# Patient Record
Sex: FEMALE | Race: BLACK OR AFRICAN AMERICAN | Employment: UNEMPLOYED | URBAN - METROPOLITAN AREA
[De-identification: names, ages, dates, MRNs, and addresses within clinical notes are randomized per-mention and may not be internally consistent; named-entity substitution may affect disease eponyms.]

---

## 2023-03-29 ENCOUNTER — APPOINTMENT (OUTPATIENT)
Dept: GENERAL RADIOLOGY | Age: 28
End: 2023-03-29
Payer: COMMERCIAL

## 2023-03-29 ENCOUNTER — HOSPITAL ENCOUNTER (EMERGENCY)
Age: 28
Discharge: HOME | End: 2023-03-29
Attending: EMERGENCY MEDICINE
Payer: COMMERCIAL

## 2023-03-29 VITALS
HEART RATE: 72 BPM | DIASTOLIC BLOOD PRESSURE: 71 MMHG | OXYGEN SATURATION: 97 % | WEIGHT: 191.36 LBS | TEMPERATURE: 98.6 F | RESPIRATION RATE: 16 BRPM | SYSTOLIC BLOOD PRESSURE: 132 MMHG

## 2023-03-29 DIAGNOSIS — M25.461 KNEE EFFUSION, RIGHT: Primary | ICD-10-CM

## 2023-03-29 PROCEDURE — 99283 EMERGENCY DEPT VISIT LOW MDM: CPT

## 2023-03-29 PROCEDURE — 73562 X-RAY EXAM OF KNEE 3: CPT

## 2023-03-29 PROCEDURE — 73630 X-RAY EXAM OF FOOT: CPT

## 2023-03-29 PROCEDURE — 6370000000 HC RX 637 (ALT 250 FOR IP): Performed by: PHYSICIAN ASSISTANT

## 2023-03-29 RX ORDER — IBUPROFEN 400 MG/1
800 TABLET ORAL ONCE
Status: COMPLETED | OUTPATIENT
Start: 2023-03-29 | End: 2023-03-29

## 2023-03-29 RX ORDER — HYDRALAZINE HYDROCHLORIDE 100 MG/1
100 TABLET, FILM COATED ORAL ONCE
Status: DISCONTINUED | OUTPATIENT
Start: 2023-03-29 | End: 2023-03-29 | Stop reason: HOSPADM

## 2023-03-29 RX ADMIN — IBUPROFEN 800 MG: 400 TABLET, FILM COATED ORAL at 20:04

## 2023-03-29 ASSESSMENT — ENCOUNTER SYMPTOMS
NAUSEA: 0
ABDOMINAL PAIN: 0
BACK PAIN: 0
EYES NEGATIVE: 1
SHORTNESS OF BREATH: 0
DIARRHEA: 0
COUGH: 0
CONSTIPATION: 0
CHEST TIGHTNESS: 0

## 2023-03-29 ASSESSMENT — PAIN - FUNCTIONAL ASSESSMENT: PAIN_FUNCTIONAL_ASSESSMENT: NONE - DENIES PAIN
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
## 2023-03-29 NOTE — DISCHARGE INSTRUCTIONS
You are seen in the emergency department for knee pain. Your right knee has a suprapatellar effusion meaning that there is swelling around the right knee. Your right knee Is in place. However, would recommend that she wear brace for comfort. You may use crutches for the next 1 week or until you see orthopedics and are feeling comfortable with ambulating. Please call them for an appointment. Return to the emergency department with redness, worsening swelling, inability to move the right knee, fevers or chills.

## 2023-03-29 NOTE — Clinical Note
Paresh Tadeo was seen and treated in our emergency department on 3/29/2023. She may return to work on 04/03/2023. If you have any questions or concerns, please don't hesitate to call.       Lucia Hollingsworth PA-C

## 2023-03-29 NOTE — ED PROVIDER NOTES
ED Attending Attestation Note     Date of evaluation: 3/29/2023    This patient was seen by the advance practice provider. I have seen and examined the patient, agree with the workup, evaluation, management and diagnosis. The care plan has been discussed. My assessment reveals patient with right knee pain, no injury. Various other joints have swollen similarly then gone down. No erythema. No pain with axial load of joint. Xray shows patellar tilt and effusion. Do not suspect septic joint but will refer to ortho and trial NSAIDs.      Juanita Monroy MD  03/29/23 2030

## 2023-04-03 ENCOUNTER — TELEPHONE (OUTPATIENT)
Dept: ORTHOPEDIC SURGERY | Age: 28
End: 2023-04-03

## 2023-04-03 NOTE — TELEPHONE ENCOUNTER
General Question     Subject: pt called about script  Patient and /or Facility Request: Lorne Peck Number: [phone]      The pt called because her pharmacy didn't have the prescription from her 3:00 visit with Dr. Harvey Man. I advised the patient to give it 24 hours.

## 2023-04-12 ENCOUNTER — APPOINTMENT (OUTPATIENT)
Dept: GENERAL RADIOLOGY | Age: 28
End: 2023-04-12
Payer: COMMERCIAL

## 2023-04-12 ENCOUNTER — HOSPITAL ENCOUNTER (EMERGENCY)
Age: 28
Discharge: HOME | End: 2023-04-12
Attending: EMERGENCY MEDICINE
Payer: COMMERCIAL

## 2023-04-12 VITALS
HEART RATE: 90 BPM | TEMPERATURE: 98.3 F | DIASTOLIC BLOOD PRESSURE: 65 MMHG | RESPIRATION RATE: 17 BRPM | WEIGHT: 175 LBS | OXYGEN SATURATION: 95 % | BODY MASS INDEX: 28.25 KG/M2 | SYSTOLIC BLOOD PRESSURE: 106 MMHG

## 2023-04-12 DIAGNOSIS — E83.42 HYPOMAGNESEMIA: ICD-10-CM

## 2023-04-12 DIAGNOSIS — E87.6 HYPOKALEMIA: ICD-10-CM

## 2023-04-12 DIAGNOSIS — R55 SYNCOPE AND COLLAPSE: Primary | ICD-10-CM

## 2023-04-12 LAB
ANION GAP SERPL CALCULATED.3IONS-SCNC: 17 MMOL/L (ref 3–16)
BASOPHILS # BLD: 0 K/UL (ref 0–0.2)
BASOPHILS NFR BLD: 0.2 %
BUN SERPL-MCNC: 11 MG/DL (ref 7–20)
CALCIUM SERPL-MCNC: 9.5 MG/DL (ref 8.3–10.6)
CHLORIDE SERPL-SCNC: 98 MMOL/L (ref 99–110)
CO2 SERPL-SCNC: 22 MMOL/L (ref 21–32)
CREAT SERPL-MCNC: 0.7 MG/DL (ref 0.6–1.1)
DEPRECATED RDW RBC AUTO: 13.3 % (ref 12.4–15.4)
EKG ATRIAL RATE: 78 BPM
EKG DIAGNOSIS: NORMAL
EKG P AXIS: 33 DEGREES
EKG P-R INTERVAL: 184 MS
EKG Q-T INTERVAL: 392 MS
EKG QRS DURATION: 74 MS
EKG QTC CALCULATION (BAZETT): 446 MS
EKG R AXIS: 90 DEGREES
EKG T AXIS: 24 DEGREES
EKG VENTRICULAR RATE: 78 BPM
EOSINOPHIL # BLD: 0.1 K/UL (ref 0–0.6)
EOSINOPHIL NFR BLD: 0.5 %
GFR SERPLBLD CREATININE-BSD FMLA CKD-EPI: >60 ML/MIN/{1.73_M2}
GLUCOSE SERPL-MCNC: 103 MG/DL (ref 70–99)
HCG SERPL QL: NEGATIVE
HCT VFR BLD AUTO: 34.3 % (ref 36–48)
HGB BLD-MCNC: 11.3 G/DL (ref 12–16)
LYMPHOCYTES # BLD: 4.3 K/UL (ref 1–5.1)
LYMPHOCYTES NFR BLD: 31 %
MAGNESIUM SERPL-MCNC: 1.3 MG/DL (ref 1.8–2.4)
MCH RBC QN AUTO: 26.6 PG (ref 26–34)
MCHC RBC AUTO-ENTMCNC: 33.1 G/DL (ref 31–36)
MCV RBC AUTO: 80.5 FL (ref 80–100)
MONOCYTES # BLD: 0.7 K/UL (ref 0–1.3)
MONOCYTES NFR BLD: 5.1 %
NEUTROPHILS # BLD: 8.8 K/UL (ref 1.7–7.7)
NEUTROPHILS NFR BLD: 63.2 %
PLATELET # BLD AUTO: 541 K/UL (ref 135–450)
PMV BLD AUTO: 7.1 FL (ref 5–10.5)
POTASSIUM SERPL-SCNC: 3 MMOL/L (ref 3.5–5.1)
RBC # BLD AUTO: 4.26 M/UL (ref 4–5.2)
SODIUM SERPL-SCNC: 137 MMOL/L (ref 136–145)
WBC # BLD AUTO: 13.9 K/UL (ref 4–11)

## 2023-04-12 PROCEDURE — 85025 COMPLETE CBC W/AUTO DIFF WBC: CPT

## 2023-04-12 PROCEDURE — 6370000000 HC RX 637 (ALT 250 FOR IP): Performed by: STUDENT IN AN ORGANIZED HEALTH CARE EDUCATION/TRAINING PROGRAM

## 2023-04-12 PROCEDURE — 6370000000 HC RX 637 (ALT 250 FOR IP): Performed by: EMERGENCY MEDICINE

## 2023-04-12 PROCEDURE — 80048 BASIC METABOLIC PNL TOTAL CA: CPT

## 2023-04-12 PROCEDURE — 96375 TX/PRO/DX INJ NEW DRUG ADDON: CPT

## 2023-04-12 PROCEDURE — 93005 ELECTROCARDIOGRAM TRACING: CPT | Performed by: EMERGENCY MEDICINE

## 2023-04-12 PROCEDURE — 96365 THER/PROPH/DIAG IV INF INIT: CPT

## 2023-04-12 PROCEDURE — 83735 ASSAY OF MAGNESIUM: CPT

## 2023-04-12 PROCEDURE — 84703 CHORIONIC GONADOTROPIN ASSAY: CPT

## 2023-04-12 PROCEDURE — 36415 COLL VENOUS BLD VENIPUNCTURE: CPT

## 2023-04-12 PROCEDURE — 71045 X-RAY EXAM CHEST 1 VIEW: CPT

## 2023-04-12 PROCEDURE — 99285 EMERGENCY DEPT VISIT HI MDM: CPT

## 2023-04-12 PROCEDURE — 6360000002 HC RX W HCPCS: Performed by: EMERGENCY MEDICINE

## 2023-04-12 PROCEDURE — 96366 THER/PROPH/DIAG IV INF ADDON: CPT

## 2023-04-12 RX ORDER — POTASSIUM CHLORIDE 20 MEQ/1
40 TABLET, EXTENDED RELEASE ORAL ONCE
Status: COMPLETED | OUTPATIENT
Start: 2023-04-12 | End: 2023-04-12

## 2023-04-12 RX ORDER — ONDANSETRON 4 MG/1
4 TABLET, ORALLY DISINTEGRATING ORAL ONCE
Status: COMPLETED | OUTPATIENT
Start: 2023-04-12 | End: 2023-04-12

## 2023-04-12 RX ORDER — ONDANSETRON 4 MG/1
4 TABLET, ORALLY DISINTEGRATING ORAL 3 TIMES DAILY PRN
Qty: 21 TABLET | Refills: 0 | Status: SHIPPED | OUTPATIENT
Start: 2023-04-12

## 2023-04-12 RX ORDER — LORAZEPAM 2 MG/ML
0.5 INJECTION INTRAMUSCULAR ONCE
Status: COMPLETED | OUTPATIENT
Start: 2023-04-12 | End: 2023-04-12

## 2023-04-12 RX ORDER — MAGNESIUM SULFATE IN WATER 40 MG/ML
2000 INJECTION, SOLUTION INTRAVENOUS ONCE
Status: COMPLETED | OUTPATIENT
Start: 2023-04-12 | End: 2023-04-12

## 2023-04-12 RX ADMIN — LORAZEPAM 0.5 MG: 2 INJECTION INTRAMUSCULAR; INTRAVENOUS at 04:35

## 2023-04-12 RX ADMIN — ONDANSETRON 4 MG: 4 TABLET, ORALLY DISINTEGRATING ORAL at 09:36

## 2023-04-12 RX ADMIN — POTASSIUM CHLORIDE 40 MEQ: 1500 TABLET, EXTENDED RELEASE ORAL at 06:01

## 2023-04-12 RX ADMIN — MAGNESIUM SULFATE HEPTAHYDRATE 2000 MG: 40 INJECTION, SOLUTION INTRAVENOUS at 06:40

## 2023-04-12 ASSESSMENT — ENCOUNTER SYMPTOMS
GASTROINTESTINAL NEGATIVE: 1
EYES NEGATIVE: 1
RESPIRATORY NEGATIVE: 1

## 2023-04-12 NOTE — DISCHARGE INSTRUCTIONS
Drink plenty of clear fluids. Continue your current medications. Follow-up with your primary care provider for repeat evaluation and further testing if symptoms continue.
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
## 2023-04-15 ENCOUNTER — APPOINTMENT (OUTPATIENT)
Dept: CT IMAGING | Age: 28
End: 2023-04-15
Payer: COMMERCIAL

## 2023-04-15 ENCOUNTER — APPOINTMENT (OUTPATIENT)
Dept: GENERAL RADIOLOGY | Age: 28
End: 2023-04-15
Payer: COMMERCIAL

## 2023-04-15 ENCOUNTER — HOSPITAL ENCOUNTER (EMERGENCY)
Age: 28
Discharge: HOME | End: 2023-04-15
Attending: STUDENT IN AN ORGANIZED HEALTH CARE EDUCATION/TRAINING PROGRAM
Payer: COMMERCIAL

## 2023-04-15 VITALS
RESPIRATION RATE: 18 BRPM | HEIGHT: 62 IN | DIASTOLIC BLOOD PRESSURE: 52 MMHG | WEIGHT: 175 LBS | OXYGEN SATURATION: 99 % | TEMPERATURE: 98.2 F | BODY MASS INDEX: 32.2 KG/M2 | HEART RATE: 65 BPM | SYSTOLIC BLOOD PRESSURE: 114 MMHG

## 2023-04-15 DIAGNOSIS — R10.11 ABDOMINAL PAIN, RIGHT UPPER QUADRANT: Primary | ICD-10-CM

## 2023-04-15 DIAGNOSIS — R07.81 PLEURITIC PAIN: ICD-10-CM

## 2023-04-15 DIAGNOSIS — E83.42 HYPOMAGNESEMIA: ICD-10-CM

## 2023-04-15 LAB
ALBUMIN SERPL-MCNC: 3.1 G/DL (ref 3.4–5)
ALBUMIN/GLOB SERPL: 0.7 {RATIO} (ref 1.1–2.2)
ALP SERPL-CCNC: 87 U/L (ref 40–129)
ALT SERPL-CCNC: 10 U/L (ref 10–40)
ANION GAP SERPL CALCULATED.3IONS-SCNC: 12 MMOL/L (ref 3–16)
AST SERPL-CCNC: 26 U/L (ref 15–37)
BASOPHILS # BLD: 0 K/UL (ref 0–0.2)
BASOPHILS NFR BLD: 0.2 %
BILIRUB SERPL-MCNC: 0.4 MG/DL (ref 0–1)
BILIRUB UR QL STRIP.AUTO: NEGATIVE
BUN SERPL-MCNC: 10 MG/DL (ref 7–20)
CALCIUM SERPL-MCNC: 8.8 MG/DL (ref 8.3–10.6)
CHLORIDE SERPL-SCNC: 100 MMOL/L (ref 99–110)
CLARITY UR: CLEAR
CO2 SERPL-SCNC: 23 MMOL/L (ref 21–32)
COLOR UR: YELLOW
CREAT SERPL-MCNC: 0.6 MG/DL (ref 0.6–1.1)
D DIMER: 2.75 UG/ML FEU (ref 0–0.6)
DEPRECATED RDW RBC AUTO: 13.3 % (ref 12.4–15.4)
EOSINOPHIL # BLD: 0.1 K/UL (ref 0–0.6)
EOSINOPHIL NFR BLD: 0.9 %
EPI CELLS #/AREA URNS HPF: ABNORMAL /HPF (ref 0–5)
GFR SERPLBLD CREATININE-BSD FMLA CKD-EPI: >60 ML/MIN/{1.73_M2}
GLUCOSE SERPL-MCNC: 97 MG/DL (ref 70–99)
GLUCOSE UR STRIP.AUTO-MCNC: NEGATIVE MG/DL
HCG UR QL: NEGATIVE
HCT VFR BLD AUTO: 36.3 % (ref 36–48)
HGB BLD-MCNC: 12 G/DL (ref 12–16)
HGB UR QL STRIP.AUTO: NEGATIVE
KETONES UR STRIP.AUTO-MCNC: NEGATIVE MG/DL
LEUKOCYTE ESTERASE UR QL STRIP.AUTO: ABNORMAL
LIPASE SERPL-CCNC: 18 U/L (ref 13–60)
LYMPHOCYTES # BLD: 1.4 K/UL (ref 1–5.1)
LYMPHOCYTES NFR BLD: 16.9 %
MAGNESIUM SERPL-MCNC: 1.7 MG/DL (ref 1.8–2.4)
MCH RBC QN AUTO: 26.7 PG (ref 26–34)
MCHC RBC AUTO-ENTMCNC: 33.1 G/DL (ref 31–36)
MCV RBC AUTO: 80.7 FL (ref 80–100)
MONOCYTES # BLD: 0.5 K/UL (ref 0–1.3)
MONOCYTES NFR BLD: 6 %
MUCOUS THREADS #/AREA URNS LPF: ABNORMAL /LPF
NEUTROPHILS # BLD: 6.3 K/UL (ref 1.7–7.7)
NEUTROPHILS NFR BLD: 76 %
NITRITE UR QL STRIP.AUTO: NEGATIVE
PH UR STRIP.AUTO: 7.5 [PH] (ref 5–8)
PLATELET # BLD AUTO: 394 K/UL (ref 135–450)
PMV BLD AUTO: 7 FL (ref 5–10.5)
POTASSIUM SERPL-SCNC: 5.1 MMOL/L (ref 3.5–5.1)
POTASSIUM SERPL-SCNC: 6.1 MMOL/L (ref 3.5–5.1)
PROT SERPL-MCNC: 7.8 G/DL (ref 6.4–8.2)
PROT UR STRIP.AUTO-MCNC: NEGATIVE MG/DL
RBC # BLD AUTO: 4.5 M/UL (ref 4–5.2)
RBC #/AREA URNS HPF: ABNORMAL /HPF (ref 0–4)
SODIUM SERPL-SCNC: 135 MMOL/L (ref 136–145)
SP GR UR STRIP.AUTO: 1.02 (ref 1–1.03)
TROPONIN T SERPL-MCNC: <0.01 NG/ML
UA COMPLETE W REFLEX CULTURE PNL UR: ABNORMAL
UA DIPSTICK W REFLEX MICRO PNL UR: YES
URN SPEC COLLECT METH UR: ABNORMAL
UROBILINOGEN UR STRIP-ACNC: 0.2 E.U./DL
WBC # BLD AUTO: 8.3 K/UL (ref 4–11)
WBC #/AREA URNS HPF: ABNORMAL /HPF (ref 0–5)

## 2023-04-15 PROCEDURE — 85025 COMPLETE CBC W/AUTO DIFF WBC: CPT

## 2023-04-15 PROCEDURE — 84484 ASSAY OF TROPONIN QUANT: CPT

## 2023-04-15 PROCEDURE — 96374 THER/PROPH/DIAG INJ IV PUSH: CPT

## 2023-04-15 PROCEDURE — 83690 ASSAY OF LIPASE: CPT

## 2023-04-15 PROCEDURE — 80053 COMPREHEN METABOLIC PANEL: CPT

## 2023-04-15 PROCEDURE — 83735 ASSAY OF MAGNESIUM: CPT

## 2023-04-15 PROCEDURE — 93005 ELECTROCARDIOGRAM TRACING: CPT | Performed by: STUDENT IN AN ORGANIZED HEALTH CARE EDUCATION/TRAINING PROGRAM

## 2023-04-15 PROCEDURE — 84132 ASSAY OF SERUM POTASSIUM: CPT

## 2023-04-15 PROCEDURE — 6360000002 HC RX W HCPCS: Performed by: STUDENT IN AN ORGANIZED HEALTH CARE EDUCATION/TRAINING PROGRAM

## 2023-04-15 PROCEDURE — 96365 THER/PROPH/DIAG IV INF INIT: CPT

## 2023-04-15 PROCEDURE — 74177 CT ABD & PELVIS W/CONTRAST: CPT

## 2023-04-15 PROCEDURE — 81001 URINALYSIS AUTO W/SCOPE: CPT

## 2023-04-15 PROCEDURE — 71275 CT ANGIOGRAPHY CHEST: CPT

## 2023-04-15 PROCEDURE — 6370000000 HC RX 637 (ALT 250 FOR IP): Performed by: STUDENT IN AN ORGANIZED HEALTH CARE EDUCATION/TRAINING PROGRAM

## 2023-04-15 PROCEDURE — 85379 FIBRIN DEGRADATION QUANT: CPT

## 2023-04-15 PROCEDURE — 71045 X-RAY EXAM CHEST 1 VIEW: CPT

## 2023-04-15 PROCEDURE — 6360000004 HC RX CONTRAST MEDICATION: Performed by: STUDENT IN AN ORGANIZED HEALTH CARE EDUCATION/TRAINING PROGRAM

## 2023-04-15 PROCEDURE — 84703 CHORIONIC GONADOTROPIN ASSAY: CPT

## 2023-04-15 PROCEDURE — 99285 EMERGENCY DEPT VISIT HI MDM: CPT

## 2023-04-15 RX ORDER — MAGNESIUM SULFATE 1 G/100ML
1000 INJECTION INTRAVENOUS ONCE
Status: COMPLETED | OUTPATIENT
Start: 2023-04-15 | End: 2023-04-15

## 2023-04-15 RX ORDER — KETOROLAC TROMETHAMINE 30 MG/ML
15 INJECTION, SOLUTION INTRAMUSCULAR; INTRAVENOUS ONCE
Status: COMPLETED | OUTPATIENT
Start: 2023-04-15 | End: 2023-04-15

## 2023-04-15 RX ORDER — LIDOCAINE 4 G/G
1 PATCH TOPICAL DAILY
Status: DISCONTINUED | OUTPATIENT
Start: 2023-04-15 | End: 2023-04-15 | Stop reason: HOSPADM

## 2023-04-15 RX ADMIN — KETOROLAC TROMETHAMINE 15 MG: 30 INJECTION, SOLUTION INTRAMUSCULAR at 14:17

## 2023-04-15 RX ADMIN — IOPAMIDOL 75 ML: 755 INJECTION, SOLUTION INTRAVENOUS at 15:47

## 2023-04-15 RX ADMIN — MAGNESIUM SULFATE IN DEXTROSE 1000 MG: 10 INJECTION, SOLUTION INTRAVENOUS at 16:43

## 2023-04-15 ASSESSMENT — ENCOUNTER SYMPTOMS
COUGH: 0
NAUSEA: 1
BLOOD IN STOOL: 0
ABDOMINAL DISTENTION: 0
DIARRHEA: 0
CONSTIPATION: 0
VOMITING: 0
SHORTNESS OF BREATH: 0
ABDOMINAL PAIN: 1

## 2023-04-15 ASSESSMENT — PAIN SCALES - GENERAL: PAINLEVEL_OUTOF10: 10

## 2023-04-15 ASSESSMENT — PAIN DESCRIPTION - ORIENTATION: ORIENTATION: RIGHT

## 2023-04-15 ASSESSMENT — PAIN DESCRIPTION - LOCATION: LOCATION: ABDOMEN

## 2023-04-15 ASSESSMENT — PAIN - FUNCTIONAL ASSESSMENT: PAIN_FUNCTIONAL_ASSESSMENT: 0-10

## 2023-04-15 NOTE — ED NOTES
Patient prepared for and ready to be discharged. Patient discharged at this time in no acute distress after verbalizing understanding of discharge instructions. Patient left after receiving After Visit Summary instructions.        Mikael Staples RN  04/15/23 9290

## 2023-04-15 NOTE — PROGRESS NOTES
[address] RESIDENT NOTE       Date of evaluation: 4/15/2023    Chief Complaint     Abdominal Pain (Started this morning, R sided, into back with breathing )      History of Present Illness     Gavin Slater is a 24 y.o. female who presents with chief complaint of abdominal pain. The pain began at 1 am this morning while the patient was resting. It is described as 10/10 constant squeezing pain that is made worse with inspiration. She has not experienced pain like this before. The pain is worse on the right side and spreads to her back. There is no associated fever, chills, chest pain, shortness of breath,nausea, vomiting, constipation, diarrhea. Pain is not related to food intake. Her medications include cetirizine and zoloft. She does not use oral contraceptives or take other medications. She was recently seen by orthopedics due to right sided knee pain for which she is in a knee brace. MRI from 4/12/23 demonstrated large knee joint effusion, with no evidence of meniscal tearing or ligamentous injury. She ambulates using crutches. ASSESSMENT / PLAN  (MEDICAL DECISION MAKING)     INITIAL VITALS: BP: (!) 128/97, Temp: 98.2 °F (36.8 °C), Heart Rate: 88, Resp: 22, SpO2: 99 %     Gavin Slater is a 24 y.o. female who presented to the emergency department due to chief complaint of abdominal pain that began at 1 am and has been progressively worsening. Her pain is right-sided and worse with deep inspiration. The patient is afebrile and hemodynamically stable. An EKG was performed which was negative for acute ischemic changes. Troponin was negative. A chest x-ray was negative for acute pathology including rib fractures. She has no leukocytosis on CBC. She had a potassium which was initially hemolyzed at 6.1 and returned at 5.1 on recheck. Magnesium is slightly low at 5.1. Liver enzymes and lipase are all within normal limits.  The patient did have an elevated d-dimer

## 2023-04-15 NOTE — ED PROVIDER NOTES
ED Attending Attestation Note     Date of evaluation: 4/15/2023    This patient was seen by the resident. I have seen and examined the patient, agree with the workup, evaluation, management and diagnosis. The care plan has been discussed. I have reviewed the ECG and concur with the resident's interpretation. My assessment reveals very pleasant 21-year-old woman currently being followed outpatient for right knee pain recently seen in the ER after syncopal episode presenting with right upper quadrant pleuritic pain that radiates around to the back. Patient denies any preceding trauma, change in activity, severe coughing or retching. Denies associated nausea, vomiting, abdominal pain, urinary symptoms, upper respiratory symptoms or dyspnea. Denies syncopal episode today. Reports she is following outpatient for knee pain and recently underwent an MRI orthopedics and denies any new symptoms stating that at this time that she is here for abdominal pain. Denies any chest pain. On exam patient is well-appearing seated stretcher no acute distress. Patient with clear lungs, strong radial pulses bilaterally, no peripheral edema. Patient with mild amount of tenderness over the right flank and right upper quadrant with no rebound or guarding. UA with small leukocyte esterase with large amount epithelial cells on micro with patient not currently having urinary symptoms with low suspicion for UTI. Given recent syncopal episode and PERC +, D-dimer ordered for evaluation of PE with pleuritic discomfort. D-dimer was positive and CT PE scan showed no evidence of acute PE.  CT abdomen pelvis showed no acute intradermal abnormalities but did note lymphadenopathy of the right leg. These results were discussed with patient. Patient abdominal pain improved with lidocaine patch and Toradol.    On repeat exam, patient had full range of motion of knee without pain with no changes to her symptoms and no fever or
Surgical Hx, Social Hx, Allergies, and FamilyHx were reviewed. ED Course as of 04/15/23 1945   Sat Apr 15, 2023   1426 WBC: 8.3 [ED]   1433 HCG(Urine) Pregnancy Test: Negative [ED]   1433 Epithelial Cells, UA(!): 6-10 [ED]   1433 WBC, UA(!): 6-9 [ED]   1537 D-Dimer, Quant(!): 2.75 [DS]   1537 D-Dimer, Quantitative(!):    D-Dimer, Quant 2.75(!) [DS]   7784 D-Dimer, Quantitative(!):    D-Dimer, Quant 2.75(!) [DS]   1539 D-Dimer, Quant(!): 2.75 [DS]      ED Course User Index  [DS] Jordy Angel MD  [ED] Iain Bustamante MD       The patient was given the following medications:  Orders Placed This Encounter   Medications    ketorolac (TORADOL) injection 15 mg    lidocaine 4 % external patch 1 patch    iopamidol (ISOVUE-370) 76 % injection 75 mL    magnesium sulfate 1000 mg in dextrose 5% 100 mL IVPB       CONSULTS:  None    Review of Systems   Constitutional:  Negative for appetite change, chills and fever. HENT:  Negative for congestion. Respiratory:  Negative for cough and shortness of breath. Cardiovascular:  Negative for chest pain, palpitations and leg swelling. Gastrointestinal:  Positive for abdominal pain and nausea. Negative for abdominal distention, blood in stool, constipation, diarrhea and vomiting. Genitourinary:  Negative for dysuria, frequency and urgency. Neurological:  Negative for headaches. Past Medical, Surgical, Family, and Social History     She has no past medical history on file. She has no past surgical history on file. Her family history is not on file. She reports that she has quit smoking. She does not have any smokeless tobacco history on file. She reports current alcohol use. She reports that she does not currently use drugs after having used the following drugs: Marijuana Ian Lindsey).     Medications     Discharge Medication List as of 4/15/2023  5:50 PM        CONTINUE these medications which have NOT CHANGED    Details   ondansetron (ZOFRAN-ODT) 4 MG disintegrating

## 2023-04-15 NOTE — DISCHARGE INSTRUCTIONS
-You were evaluated in the emergency department due to right sided pain in your abdomen.   -We performed CT imaging abdomen and pelvis which did not show any abnormalities such as an abscess or kidney stone. It did show that you slightly enlarged lymph nodes, which were related to your recent knee swelling as we had discussed. We also performed CT imaging of your chest to evaluate for a pulmonary embolism, which is a blood clot in your lungs. This test was also negative.   -We gave you pain medication (lidocaine patch and Toradol injection) which resulted in improvement of pain. You can alternate taking tylenol and ibuprofen which are available over the counter, with care not to exceed the maximum dose for either in a 24 hour period. You may also use lidocaine patches over the counter as needed for pain control.   -You should return to the emergency department to be evaluated if you experience fevers, syncope (passing out or losing consciousness), severe worsening of chest or abdominal pain, shortness of breath, worsening knee pain. -You should follow up in one week with your primary care physician and call to make an appointment with them. You should continue to follow up with your scheduled orthopedic appointment for ongoing evaluation of your knee.

## 2023-04-16 LAB
EKG ATRIAL RATE: 67 BPM
EKG DIAGNOSIS: NORMAL
EKG P AXIS: 30 DEGREES
EKG P-R INTERVAL: 178 MS
EKG Q-T INTERVAL: 366 MS
EKG QRS DURATION: 84 MS
EKG QTC CALCULATION (BAZETT): 386 MS
EKG R AXIS: 112 DEGREES
EKG T AXIS: 90 DEGREES
EKG VENTRICULAR RATE: 67 BPM

## 2023-04-24 ENCOUNTER — OFFICE VISIT (OUTPATIENT)
Dept: ORTHOPEDIC SURGERY | Age: 28
End: 2023-04-24
Payer: COMMERCIAL

## 2023-04-24 VITALS — HEIGHT: 62 IN | WEIGHT: 175 LBS | BODY MASS INDEX: 32.2 KG/M2

## 2023-04-24 DIAGNOSIS — M25.461 EFFUSION OF RIGHT KNEE: ICD-10-CM

## 2023-04-24 DIAGNOSIS — M25.561 RIGHT KNEE PAIN, UNSPECIFIED CHRONICITY: Primary | ICD-10-CM

## 2023-04-24 PROCEDURE — G8417 CALC BMI ABV UP PARAM F/U: HCPCS | Performed by: ORTHOPAEDIC SURGERY

## 2023-04-24 PROCEDURE — 99214 OFFICE O/P EST MOD 30 MIN: CPT | Performed by: ORTHOPAEDIC SURGERY

## 2023-04-24 PROCEDURE — G8427 DOCREV CUR MEDS BY ELIG CLIN: HCPCS | Performed by: ORTHOPAEDIC SURGERY

## 2023-04-24 PROCEDURE — 1036F TOBACCO NON-USER: CPT | Performed by: ORTHOPAEDIC SURGERY

## 2023-04-26 NOTE — PROGRESS NOTES
2023     Reason for visit:  Right knee pain    History of Present Illness: The patient is a 22-year-old female who presents for evaluation of her right knee. She reports that she recently injured herself when she twisted her knee. She felt immediate pain. Since then she has had swelling in the knee as well as intermittent catching and locking. She has difficulty bearing weight. She also reports decreased range of motion. At the last visit we elected proceed with an MRI. However she does report that she is about 90 to 100% better overall. Objective:  Ht 5' 2\" (1.575 m)   Wt 175 lb (79.4 kg)   BMI 32.01 kg/m²      Physical Exam:  The patient is well-appearing and in no apparent distress  Examination of the right knee   Examination of the right knee   There is a trace effusion, no gross deformity or skin changes  Range of motion reveals 0 to 125  Neg lachman, negative posterior drawer, no pain or laxity with varus or valgus stress at 0 degrees and 30 degrees of flexion  no joint line tenderness  5 out of 5 strength throughout distal muscle groups  Sensation is intact to light touch throughout all distributions  There is no calf swelling or tenderness  Palpable DP pulse, brisk cap refill, 2+ symmetric reflexes     Imagin view x-rays of the right knee were obtained the office today on 4/3/2023 and reviewed. There is no fracture or dislocation. No other abnormality. MRI of the right knee was reviewed. Effusion is present. No ligamentous or meniscus injury present. Assessment:  Right knee pain following traumatic injury. \MRI is unremarkable. Suspect capsular sprain versus other condition    Plan: It is reassuring the patient is feeling so much better. However it is unclear the exact etiology to the patient's effusion. Regardless she is near 100% better. We will initiate physical therapy. She will return to see me as needed.   If she does remain symptomatic or the symptoms

## 2023-05-02 ENCOUNTER — HOSPITAL ENCOUNTER (OUTPATIENT)
Dept: PHYSICAL THERAPY | Age: 28
Setting detail: THERAPIES SERIES
Discharge: HOME | End: 2023-05-02

## 2023-05-02 NOTE — FLOWSHEET NOTE
[facility] and [address],  [facility], [facility] [address]  [address] [facility],5[address] Floor   [place]  Phone: [phone]  Fax: [phone]      Physical Therapy  Cancellation/No-show Note  Patient Name:  Margie Castro  :  1995   Date:  2023  Cancelled visits to date: 1  No-shows to date: 0    For today's appointment patient:  [x]  Cancelled  [x]  Rescheduled appointment  []  No-show     Reason given by patient:  []  Patient ill  []  Conflicting appointment   []  No transportation    []  Conflict with work  []  No reason given  []  Other:     Comments:      Electronically signed by:  Tanya Lyles PT, DPT, OCS  Physical Therapist  [id number]  [email or web address]
Attending Attestation (For Attendings USE Only)...

## 2023-05-12 ENCOUNTER — HOSPITAL ENCOUNTER (OUTPATIENT)
Dept: PHYSICAL THERAPY | Age: 28
Setting detail: THERAPIES SERIES
Discharge: HOME | End: 2023-05-12

## 2023-09-01 ENCOUNTER — HOSPITAL ENCOUNTER (EMERGENCY)
Age: 28
Discharge: HOME | End: 2023-09-01
Attending: EMERGENCY MEDICINE

## 2023-09-01 VITALS
OXYGEN SATURATION: 100 % | RESPIRATION RATE: 16 BRPM | DIASTOLIC BLOOD PRESSURE: 67 MMHG | WEIGHT: 206 LBS | HEIGHT: 62 IN | BODY MASS INDEX: 37.91 KG/M2 | TEMPERATURE: 98.2 F | HEART RATE: 57 BPM | SYSTOLIC BLOOD PRESSURE: 108 MMHG

## 2023-09-01 DIAGNOSIS — R55 SYNCOPE AND COLLAPSE: Primary | ICD-10-CM

## 2023-09-01 PROCEDURE — 6370000000 HC RX 637 (ALT 250 FOR IP): Performed by: EMERGENCY MEDICINE

## 2023-09-01 PROCEDURE — 99283 EMERGENCY DEPT VISIT LOW MDM: CPT

## 2023-09-01 PROCEDURE — 93005 ELECTROCARDIOGRAM TRACING: CPT

## 2023-09-01 RX ORDER — ACETAMINOPHEN 500 MG
1000 TABLET ORAL ONCE
Status: COMPLETED | OUTPATIENT
Start: 2023-09-01 | End: 2023-09-01

## 2023-09-01 RX ADMIN — ACETAMINOPHEN 1000 MG: 500 TABLET ORAL at 20:11

## 2023-09-01 ASSESSMENT — PAIN SCALES - GENERAL: PAINLEVEL_OUTOF10: 8

## 2023-09-01 ASSESSMENT — PAIN DESCRIPTION - LOCATION: LOCATION: HEAD

## 2023-09-01 ASSESSMENT — PAIN - FUNCTIONAL ASSESSMENT: PAIN_FUNCTIONAL_ASSESSMENT: 0-10

## 2023-09-01 ASSESSMENT — ENCOUNTER SYMPTOMS
EYE DISCHARGE: 0
ABDOMINAL PAIN: 0
DIARRHEA: 0
SORE THROAT: 0
SHORTNESS OF BREATH: 0
BACK PAIN: 0
WHEEZING: 0
EYE ITCHING: 0
CONSTIPATION: 0
COUGH: 0
EYE PAIN: 0
RHINORRHEA: 0
NAUSEA: 0

## 2023-09-01 ASSESSMENT — LIFESTYLE VARIABLES
HOW MANY STANDARD DRINKS CONTAINING ALCOHOL DO YOU HAVE ON A TYPICAL DAY: 1 OR 2
HOW OFTEN DO YOU HAVE A DRINK CONTAINING ALCOHOL: MONTHLY OR LESS

## 2023-09-01 ASSESSMENT — PAIN DESCRIPTION - ORIENTATION: ORIENTATION: LEFT

## 2023-09-01 NOTE — ED TRIAGE NOTES
Brought in for passing out at work pt stated have not eaten anything today, hit her head, not in blood thinners.

## 2023-09-02 LAB
EKG ATRIAL RATE: 58 BPM
EKG DIAGNOSIS: NORMAL
EKG P AXIS: 55 DEGREES
EKG P-R INTERVAL: 202 MS
EKG Q-T INTERVAL: 396 MS
EKG QRS DURATION: 76 MS
EKG QTC CALCULATION (BAZETT): 388 MS
EKG R AXIS: 90 DEGREES
EKG T AXIS: 17 DEGREES
EKG VENTRICULAR RATE: 58 BPM

## 2023-09-02 NOTE — ED PROVIDER NOTES
ED Attending Attestation Note     Date of evaluation: 9/1/2023    This patient was seen by the advance practice provider. I have seen and examined the patient, agree with the workup, evaluation, management and diagnosis. The care plan has been discussed. I have reviewed the ECG and concur with the HIMANSHU's interpretation. My assessment reveals a 79-year-old female who presents with a chief complaint of loss of consciousness. Patient states she often does not eat at work and then passes out. Has been seen here before for vasovagal syncope after stress as well as passing out at work previously. Patient on exam states she has a very mild headache because she thinks she hit her head. General: This is a wd/wn female  in no acute distress who appears their stated age. HEENT: NC/AT. PERRL. OP clear. MMM. Neck: Supple. Trachea midline. Pulmonary: Normal work of breathing, not using accessory muscles or pursed lip breathing    Cardiac: RRR    Abdomen: S/NT/ND/+BS. No cva tenderness. Musculoskeletal: WWP with no clubbing, cyanosis, or deformities noted. Vascular: +2 radial and DP pulses. Skin: Warm and dry with no lesions noted    Neuro:  AAOx4. Face is grossly symmetric. Gait not assessed. Moving all 4 extremities equally and spontaneously. GCS 15. Normal neurologic exam.  No findings of significant trauma.        Cristofer Mendoza MD  09/01/23 2009

## 2023-09-02 NOTE — ED NOTES
Patient prepared for and ready to be discharged. Dressed in clothes and given belongings. Patient discharged at this time in no acute distress after pt verbalized understanding of discharge instructions. Reviewed medications, and when to return to the ED with patient. Encouraged follow up with PCP  Patient walked to [facility].      Kalina Barba RN  09/01/23 2029

## 2023-09-02 NOTE — ED NOTES
Pt ambulated to the hallway, didn't feel any dizziness, with steady gait, no pain felt.      Aide Coker RN  09/01/23 2015

## 2024-12-08 ENCOUNTER — HOSPITAL ENCOUNTER (EMERGENCY)
Age: 29
Discharge: HOME | End: 2024-12-09
Attending: STUDENT IN AN ORGANIZED HEALTH CARE EDUCATION/TRAINING PROGRAM
Payer: COMMERCIAL

## 2024-12-08 DIAGNOSIS — Z71.1 CONCERN ABOUT STI IN FEMALE WITHOUT DIAGNOSIS: Primary | ICD-10-CM

## 2024-12-08 LAB
BACTERIA GENITAL QL WET PREP: NORMAL
BILIRUB UR QL STRIP.AUTO: NEGATIVE
CLARITY UR: CLEAR
CLUE CELLS SPEC QL WET PREP: NORMAL
COLOR UR: YELLOW
EPI CELLS SPEC QL WET PREP: NORMAL
GLUCOSE UR STRIP.AUTO-MCNC: NEGATIVE MG/DL
HCG UR QL: NEGATIVE
HGB UR QL STRIP.AUTO: NEGATIVE
KETONES UR STRIP.AUTO-MCNC: NEGATIVE MG/DL
LEUKOCYTE ESTERASE UR QL STRIP.AUTO: NEGATIVE
NITRITE UR QL STRIP.AUTO: NEGATIVE
PH UR STRIP.AUTO: 6 [PH] (ref 5–8)
PROT UR STRIP.AUTO-MCNC: NEGATIVE MG/DL
RBC SPEC QL WET PREP: NORMAL
SP GR UR STRIP.AUTO: 1.02 (ref 1–1.03)
SPECIMEN SOURCE FLD: NORMAL
T VAGINALIS GENITAL QL WET PREP: NORMAL
UA COMPLETE W REFLEX CULTURE PNL UR: NORMAL
UA DIPSTICK W REFLEX MICRO PNL UR: NORMAL
URN SPEC COLLECT METH UR: NORMAL
UROBILINOGEN UR STRIP-ACNC: 0.2 E.U./DL
WBC SPEC QL WET PREP: NORMAL
YEAST GENITAL QL WET PREP: NORMAL

## 2024-12-08 PROCEDURE — 99284 EMERGENCY DEPT VISIT MOD MDM: CPT

## 2024-12-08 PROCEDURE — 81003 URINALYSIS AUTO W/O SCOPE: CPT

## 2024-12-08 PROCEDURE — 87491 CHLMYD TRACH DNA AMP PROBE: CPT

## 2024-12-08 PROCEDURE — 87210 SMEAR WET MOUNT SALINE/INK: CPT

## 2024-12-08 PROCEDURE — 87591 N.GONORRHOEAE DNA AMP PROB: CPT

## 2024-12-08 PROCEDURE — 84703 CHORIONIC GONADOTROPIN ASSAY: CPT

## 2024-12-08 RX ORDER — CEFTRIAXONE 500 MG/1
500 INJECTION, POWDER, FOR SOLUTION INTRAMUSCULAR; INTRAVENOUS ONCE
Status: COMPLETED | OUTPATIENT
Start: 2024-12-09 | End: 2024-12-09

## 2024-12-08 RX ORDER — DOXYCYCLINE HYCLATE 100 MG
100 TABLET ORAL ONCE
Status: COMPLETED | OUTPATIENT
Start: 2024-12-09 | End: 2024-12-09

## 2024-12-09 VITALS
BODY MASS INDEX: 38.99 KG/M2 | HEIGHT: 62 IN | TEMPERATURE: 97.7 F | HEART RATE: 77 BPM | OXYGEN SATURATION: 99 % | DIASTOLIC BLOOD PRESSURE: 86 MMHG | WEIGHT: 211.86 LBS | RESPIRATION RATE: 16 BRPM | SYSTOLIC BLOOD PRESSURE: 124 MMHG

## 2024-12-09 LAB
C TRACH DNA CVX QL NAA+PROBE: NEGATIVE
N GONORRHOEA DNA CERV MUCUS QL NAA+PROBE: NEGATIVE

## 2024-12-09 PROCEDURE — 6370000000 HC RX 637 (ALT 250 FOR IP): Performed by: STUDENT IN AN ORGANIZED HEALTH CARE EDUCATION/TRAINING PROGRAM

## 2024-12-09 PROCEDURE — 6360000002 HC RX W HCPCS: Performed by: STUDENT IN AN ORGANIZED HEALTH CARE EDUCATION/TRAINING PROGRAM

## 2024-12-09 PROCEDURE — 96372 THER/PROPH/DIAG INJ SC/IM: CPT

## 2024-12-09 RX ORDER — DOXYCYCLINE HYCLATE 100 MG
100 TABLET ORAL 2 TIMES DAILY
Qty: 14 TABLET | Refills: 0 | Status: SHIPPED | OUTPATIENT
Start: 2024-12-09 | End: 2024-12-16

## 2024-12-09 RX ADMIN — DOXYCYCLINE HYCLATE 100 MG: 100 TABLET, COATED ORAL at 00:05

## 2024-12-09 RX ADMIN — CEFTRIAXONE SODIUM 500 MG: 500 INJECTION, POWDER, FOR SOLUTION INTRAMUSCULAR; INTRAVENOUS at 00:05

## 2024-12-09 ASSESSMENT — PAIN - FUNCTIONAL ASSESSMENT
PAIN_FUNCTIONAL_ASSESSMENT: NONE - DENIES PAIN
PAIN_FUNCTIONAL_ASSESSMENT: NONE - DENIES PAIN

## 2024-12-09 NOTE — DISCHARGE INSTRUCTIONS
As we discussed we recommend you follow-up with your primary doctor in the next 3 to 4 days and return to the emergency department if you have worsening symptoms or other symptoms that you find concerning for emergent illness or injury.  A prescription for doxycycline has been sent to your pharmacy, please take this twice a day until complete.  If you are unable to get in with your primary doctor the phone number for [facility] internal medicine residency clinic has been listed on the discharge papers please call and establish care.

## 2024-12-09 NOTE — ED NOTES
Patient has been cleared for discharge from the Emergency Department. Departure condition assessed as good, with the patient able to ambulate. Discharge instructions thoroughly reviewed.Patient instructed to follow up, emphasizing  pain management, and medication regimen. Patient  demonstrated clear understanding of instructions.Patient was given the signs and symptoms of worsening condition and told to return if they occurred. Patient understands the plan and management and all questions were answered.

## 2024-12-09 NOTE — ED NOTES
This RN bedside at request of provider to confirm allergies.  Patient has allergy to PCN listed in chart.  Patient is unaware of actual allergy to PCN, but has always been told that by her parent.  Patient has been treated in the past for STI and has never had a reaction to Rocephin, Doxycycline, or Flagyl.  Provider notified of corrected allergy status and previously listed allergy removed from patient chart.

## 2024-12-09 NOTE — ED PROVIDER NOTES
Called and spoke to patient, patient understood and ha no questions at this time patient had an allergy, anaphylactic, to penicillins listed and would need IM gentamicin and azithromycin orally as an alternative regimen however on discussion with the patient per RN she states that the allergy listed was according to her mother and she has had cephalosporins including ceftriaxone for STI concerns in the past without reaction. [DG]      ED Course User Index  [DG] Aiden Hays MD         ED Medications managed:  Medications   cefTRIAXone (ROCEPHIN) injection 500 mg (500 mg IntraMUSCular Given 12/9/24 0005)   doxycycline hyclate (VIBRA-TABS) tablet 100 mg (100 mg Oral Given 12/9/24 0005)       PROCEDURES:  Unless otherwise noted below, none.     Procedures    FINAL IMPRESSION      1. Concern about STI in female without diagnosis          DISPOSITION    Decision To Discharge 12/09/2024 12:00:50 AM      PATIENT REFERRED TO:  [facility]  [address] [place] [address] [id number] [phone]    If symptoms worsen    [facility]  [address] [address]  [phone]  Call   As needed      DISCHARGE MEDICATIONS:  Discharge Medication List as of 12/9/2024 12:15 AM        START taking these medications    Details   doxycycline hyclate (VIBRA-TABS) 100 MG tablet Take 1 tablet by mouth 2 times daily for 7 days, Disp-14 tablet, R-0Normal                (Comment: Please note this report has been produced using speech recognition software and may contain errors related to that system including errors in grammar, punctuation, and spelling, as well as words and phrases that may be inappropriate.  If there are any questions or concerns please feel free to contact the dictating provider for clarification.)    AIDEN HAYS MD (electronically signed)  Emergency Medicine Provider        Aiden Hays MD  12/09/24 5702     open, soft